# Patient Record
Sex: FEMALE | Race: WHITE | NOT HISPANIC OR LATINO | Employment: UNEMPLOYED | URBAN - METROPOLITAN AREA
[De-identification: names, ages, dates, MRNs, and addresses within clinical notes are randomized per-mention and may not be internally consistent; named-entity substitution may affect disease eponyms.]

---

## 2017-10-11 ENCOUNTER — OFFICE VISIT (OUTPATIENT)
Dept: CARDIOLOGY | Facility: CLINIC | Age: 42
End: 2017-10-11

## 2017-10-11 VITALS
HEART RATE: 122 BPM | DIASTOLIC BLOOD PRESSURE: 74 MMHG | SYSTOLIC BLOOD PRESSURE: 93 MMHG | BODY MASS INDEX: 30.72 KG/M2 | WEIGHT: 173.4 LBS | HEIGHT: 63 IN

## 2017-10-11 DIAGNOSIS — R06.09 DYSPNEA ON EXERTION: ICD-10-CM

## 2017-10-11 DIAGNOSIS — R07.2 PRECORDIAL PAIN: ICD-10-CM

## 2017-10-11 DIAGNOSIS — R00.2 PALPITATIONS: ICD-10-CM

## 2017-10-11 DIAGNOSIS — R00.0 TACHYCARDIA: Primary | ICD-10-CM

## 2017-10-11 PROCEDURE — 93000 ELECTROCARDIOGRAM COMPLETE: CPT | Performed by: INTERNAL MEDICINE

## 2017-10-11 PROCEDURE — 99213 OFFICE O/P EST LOW 20 MIN: CPT | Performed by: INTERNAL MEDICINE

## 2017-10-11 RX ORDER — SERTRALINE HYDROCHLORIDE 100 MG/1
100 TABLET, FILM COATED ORAL DAILY
COMMUNITY

## 2017-10-11 NOTE — PROGRESS NOTES
[facility] [place] [facility]  Office visit  Sofia Bazan  1975  [phone]    VISIT DATE:  10/11/2017    PCP: Clifford Noyola MD  [id number] [address]    CC:  No chief complaint on file.      PROBLEM LIST:  1. Lower extremity edema/upper extremity edema.  2. Chest pain with previous stress test greater than 10 years ago, data insufficient:  a.  Echocardiogram showing normal LV function, no valvular or wall motion abnormalities, 04/27/2015.  b. Myocardial perfusion study, 04/22/2015:  Normal LV function, no ST abnormalities.  3. Tachypalpitations:  a.  Holter monitor showing PACs only.  4. Ongoing tobacco abuse, 1 pack  per day.  5. Insulin-dependent type 2 diabetes mellitus.  a. Uncontrolled  with recent hemoglobin A1c of 9.  b. Recent hospitalization, March 2015, for DKA.  c. History of insulin pump, now with removal secondary to poor control.  6. History of hepatitis B and C.  7. Acid reflux.  8. History of colitis.  9. Anxiety and depression.  10. Unspecified thyroid disorder.  11. Family history.  12. Surgical  history.  a. Insulin pump with removal.    ASSESSMENT:   Diagnosis Plan   1. Tachycardia     2. Palpitations         PLAN:  Continue low-dose beta-blockade, orthostasis limiting further titration  Transthoracic echo to screen for underlying occult structural or functional heart disease  Recommending ischemia evaluation with exercise myocardial perfusion imaging  Smoking cessation, currently not ready to quit or cut back    Subjective  A 41-year-old active smoker with a history of symptomatic premature contractions palpitations presents for routine follow-up.  She is, smoking 2 packs per day.  Has had worsening dyspnea on exertion and a class II pattern.  Also with intermittent dull substernal chest pressure which can occur at rest or with activity.  Intermittent lightheadedness.  Resting heart rate runs around 100 bpm, or go up to 160 beats with  "minimal activity.  She appears compliant with low-dose beta blockade.  Blood pressures running less than 120/80 mmHg.    PHYSICAL EXAMINATION:  Vitals:    10/11/17 1507   BP: 93/74   BP Location: Left arm   Patient Position: Sitting   Pulse: (!) 122   Weight: 173 lb 6.4 oz (78.7 kg)   Height: 63\" (160 cm)     General Appearance:    Alert, cooperative, no distress, appears stated age   Head:    Normocephalic, without obvious abnormality, atraumatic   Eyes:    conjunctiva/corneas clear   Nose:   Nares normal, septum midline, mucosa normal, no drainage   Throat:   Lips, teeth and gums normal   Neck:   Supple, symmetrical, trachea midline, no carotid    bruit or JVD   Lungs:     Clear to auscultation bilaterally, respirations unlabored   Chest Wall:    No tenderness or deformity    Heart:    Tachycardia, S1 and S2 normal, no murmur, rub   or gallop, normal carotid impulse bilaterally without bruit.   Abdomen:     Soft, non-tender   Extremities:   Extremities normal, atraumatic, no cyanosis or edema   Pulses:   2+ and symmetric all extremities   Skin:   Skin color, texture, turgor normal, no rashes or lesions       Diagnostic Data:    ECG 12 Lead  Date/Time: 10/11/2017 3:35 PM  Performed by: TERESA EDWARD III  Authorized by: TERESA EDWARD III   Rhythm: sinus tachycardia  Rate: tachycardic  Conduction: conduction normal  ST Segments: ST segments normal  T Waves: T waves normal  QRS axis: normal  Clinical impression: normal ECG          No results found for: CHLPL, TRIG, HDL, LDLDIRECT  No results found for: GLUCOSE, BUN, CREATININE, NA, K, CL, CO2, CREATININE, BUN  No results found for: HGBA1C  No results found for: WBC, HGB, HCT, PLT    Allergies  No Known Allergies    Current Medications    Current Outpatient Prescriptions:   •  albuterol (PROVENTIL HFA;VENTOLIN HFA) 108 (90 BASE) MCG/ACT inhaler, Inhale 2 puffs daily., Disp: , Rfl:   •  alendronate (FOSAMAX) 70 MG tablet, Take 70 mg by mouth every 7 days., Disp: , Rfl: "   •  bisoprolol (ZEBETA) 5 MG tablet, Take 0.5 tablets by mouth every night., Disp: 30 tablet, Rfl: 1  •  buprenorphine-naloxone (SUBOXONE) 8-2 MG per SL tablet, Place 1 tablet under the tongue every 8 (eight) hours., Disp: , Rfl:   •  esomeprazole (NexIUM) 40 MG capsule, Take 40 mg by mouth every morning before breakfast., Disp: , Rfl:   •  insulin detemir (LEVEMIR) 100 UNIT/ML injection, Inject  under the skin daily., Disp: , Rfl:   •  nitroglycerin (NITROSTAT) 0.4 MG SL tablet, Place 0.4 mg under the tongue every 5 (five) minutes as needed for chest pain. Take no more than 3 doses in 15 minutes., Disp: , Rfl:   •  NOVOLOG FLEXPEN 100 UNIT/ML solution pen-injector sc pen, INJECT 2 UNITS SUBCUTANEOUSLY IF ABOVE 150 AS DIRECTED -- FOR EVERY 50 POINTS GIVE 2 EXTRA UNITS, Disp: , Rfl: 0  •  pregabalin (LYRICA) 100 MG capsule, Take 200 mg by mouth Every 8 (Eight) Hours. Take 1-1/2 tablets daily , Disp: , Rfl:   •  QUEtiapine (SEROquel) 200 MG tablet, daily., Disp: , Rfl: 0  •  sertraline (ZOLOFT) 100 MG tablet, Take 100 mg by mouth Daily., Disp: , Rfl:           ROS  Review of Systems   Cardiovascular: Positive for chest pain, dyspnea on exertion, irregular heartbeat and palpitations.   Respiratory: Positive for shortness of breath and wheezing.        SOCIAL HX  Social History     Social History   • Marital status: Single     Spouse name: N/A   • Number of children: N/A   • Years of education: N/A     Occupational History   • Not on file.     Social History Main Topics   • Smoking status: Current Every Day Smoker     Packs/day: 1.00   • Smokeless tobacco: Not on file   • Alcohol use Defer   • Drug use: Defer   • Sexual activity: Defer     Other Topics Concern   • Not on file     Social History Narrative       FAMILY HX  No family history on file.          David Julio III, MD, [facility]